# Patient Record
Sex: MALE | Race: ASIAN | NOT HISPANIC OR LATINO | Employment: OTHER | URBAN - METROPOLITAN AREA
[De-identification: names, ages, dates, MRNs, and addresses within clinical notes are randomized per-mention and may not be internally consistent; named-entity substitution may affect disease eponyms.]

---

## 2022-03-06 ENCOUNTER — APPOINTMENT (OUTPATIENT)
Dept: CT IMAGING | Facility: HOSPITAL | Age: 52
End: 2022-03-06
Attending: EMERGENCY MEDICINE
Payer: COMMERCIAL

## 2022-03-06 ENCOUNTER — APPOINTMENT (OUTPATIENT)
Dept: MRI IMAGING | Facility: HOSPITAL | Age: 52
End: 2022-03-06
Attending: EMERGENCY MEDICINE
Payer: COMMERCIAL

## 2022-03-06 ENCOUNTER — HOSPITAL ENCOUNTER (EMERGENCY)
Facility: HOSPITAL | Age: 52
Discharge: HOME | End: 2022-03-07
Attending: EMERGENCY MEDICINE | Admitting: EMERGENCY MEDICINE
Payer: COMMERCIAL

## 2022-03-06 VITALS
HEART RATE: 77 BPM | OXYGEN SATURATION: 96 % | RESPIRATION RATE: 20 BRPM | SYSTOLIC BLOOD PRESSURE: 131 MMHG | WEIGHT: 165 LBS | DIASTOLIC BLOOD PRESSURE: 71 MMHG | BODY MASS INDEX: 29.23 KG/M2 | HEIGHT: 63 IN | TEMPERATURE: 98.3 F

## 2022-03-06 DIAGNOSIS — R51.9 NONINTRACTABLE HEADACHE, UNSPECIFIED CHRONICITY PATTERN, UNSPECIFIED HEADACHE TYPE: ICD-10-CM

## 2022-03-06 DIAGNOSIS — I10 HYPERTENSION, UNSPECIFIED TYPE: ICD-10-CM

## 2022-03-06 DIAGNOSIS — N18.32 STAGE 3B CHRONIC KIDNEY DISEASE (H): ICD-10-CM

## 2022-03-06 LAB
ALBUMIN MFR UR ELPH: 22.8 MG/DL
ALBUMIN SERPL-MCNC: 4.2 G/DL (ref 3.5–5)
ALBUMIN UR-MCNC: 20 MG/DL
ALP SERPL-CCNC: 69 U/L (ref 45–120)
ALT SERPL W P-5'-P-CCNC: 49 U/L (ref 0–45)
ANION GAP SERPL CALCULATED.3IONS-SCNC: 11 MMOL/L (ref 5–18)
APPEARANCE UR: CLEAR
AST SERPL W P-5'-P-CCNC: 30 U/L (ref 0–40)
BASOPHILS # BLD AUTO: 0.1 10E3/UL (ref 0–0.2)
BASOPHILS NFR BLD AUTO: 1 %
BILIRUB SERPL-MCNC: 0.8 MG/DL (ref 0–1)
BILIRUB UR QL STRIP: NEGATIVE
BUN SERPL-MCNC: 33 MG/DL (ref 8–22)
CALCIUM SERPL-MCNC: 9.5 MG/DL (ref 8.5–10.5)
CHLORIDE BLD-SCNC: 107 MMOL/L (ref 98–107)
CO2 SERPL-SCNC: 21 MMOL/L (ref 22–31)
COLOR UR AUTO: ABNORMAL
CREAT SERPL-MCNC: 2.36 MG/DL (ref 0.7–1.3)
CREAT UR-MCNC: 144 MG/DL
EOSINOPHIL # BLD AUTO: 0.1 10E3/UL (ref 0–0.7)
EOSINOPHIL NFR BLD AUTO: 1 %
ERYTHROCYTE [DISTWIDTH] IN BLOOD BY AUTOMATED COUNT: 13.9 % (ref 10–15)
GFR SERPL CREATININE-BSD FRML MDRD: 32 ML/MIN/1.73M2
GLUCOSE BLD-MCNC: 96 MG/DL (ref 70–125)
GLUCOSE UR STRIP-MCNC: NEGATIVE MG/DL
HCT VFR BLD AUTO: 54.8 % (ref 40–53)
HGB BLD-MCNC: 18.3 G/DL (ref 13.3–17.7)
HGB UR QL STRIP: NEGATIVE
HOLD SPECIMEN: NORMAL
HOLD SPECIMEN: NORMAL
IMM GRANULOCYTES # BLD: 0 10E3/UL
IMM GRANULOCYTES NFR BLD: 0 %
KETONES UR STRIP-MCNC: NEGATIVE MG/DL
LEUKOCYTE ESTERASE UR QL STRIP: NEGATIVE
LYMPHOCYTES # BLD AUTO: 1.9 10E3/UL (ref 0.8–5.3)
LYMPHOCYTES NFR BLD AUTO: 17 %
MCH RBC QN AUTO: 29.2 PG (ref 26.5–33)
MCHC RBC AUTO-ENTMCNC: 33.4 G/DL (ref 31.5–36.5)
MCV RBC AUTO: 87 FL (ref 78–100)
MONOCYTES # BLD AUTO: 0.7 10E3/UL (ref 0–1.3)
MONOCYTES NFR BLD AUTO: 6 %
MUCOUS THREADS #/AREA URNS LPF: PRESENT /LPF
NEUTROPHILS # BLD AUTO: 8.4 10E3/UL (ref 1.6–8.3)
NEUTROPHILS NFR BLD AUTO: 75 %
NITRATE UR QL: NEGATIVE
NRBC # BLD AUTO: 0 10E3/UL
NRBC BLD AUTO-RTO: 0 /100
PH UR STRIP: 5 [PH] (ref 5–7)
PLATELET # BLD AUTO: 196 10E3/UL (ref 150–450)
POTASSIUM BLD-SCNC: 4.1 MMOL/L (ref 3.5–5)
PROT SERPL-MCNC: 8.8 G/DL (ref 6–8)
PROT/CREAT 24H UR: 0.16 MG/MG CR
RBC # BLD AUTO: 6.27 10E6/UL (ref 4.4–5.9)
RBC URINE: 0 /HPF
SODIUM SERPL-SCNC: 139 MMOL/L (ref 136–145)
SP GR UR STRIP: 1.02 (ref 1–1.03)
UROBILINOGEN UR STRIP-MCNC: <2 MG/DL
WBC # BLD AUTO: 11.1 10E3/UL (ref 4–11)
WBC URINE: 0 /HPF

## 2022-03-06 PROCEDURE — 99285 EMERGENCY DEPT VISIT HI MDM: CPT | Mod: 25

## 2022-03-06 PROCEDURE — 81001 URINALYSIS AUTO W/SCOPE: CPT | Performed by: EMERGENCY MEDICINE

## 2022-03-06 PROCEDURE — 36415 COLL VENOUS BLD VENIPUNCTURE: CPT | Performed by: EMERGENCY MEDICINE

## 2022-03-06 PROCEDURE — 85025 COMPLETE CBC W/AUTO DIFF WBC: CPT | Performed by: EMERGENCY MEDICINE

## 2022-03-06 PROCEDURE — 70549 MR ANGIOGRAPH NECK W/O&W/DYE: CPT

## 2022-03-06 PROCEDURE — 70545 MR ANGIOGRAPHY HEAD W/DYE: CPT

## 2022-03-06 PROCEDURE — 93005 ELECTROCARDIOGRAM TRACING: CPT | Performed by: STUDENT IN AN ORGANIZED HEALTH CARE EDUCATION/TRAINING PROGRAM

## 2022-03-06 PROCEDURE — 82040 ASSAY OF SERUM ALBUMIN: CPT | Performed by: EMERGENCY MEDICINE

## 2022-03-06 PROCEDURE — 250N000013 HC RX MED GY IP 250 OP 250 PS 637: Performed by: EMERGENCY MEDICINE

## 2022-03-06 PROCEDURE — 70553 MRI BRAIN STEM W/O & W/DYE: CPT

## 2022-03-06 PROCEDURE — 70544 MR ANGIOGRAPHY HEAD W/O DYE: CPT

## 2022-03-06 PROCEDURE — A9585 GADOBUTROL INJECTION: HCPCS | Performed by: EMERGENCY MEDICINE

## 2022-03-06 PROCEDURE — 84156 ASSAY OF PROTEIN URINE: CPT | Performed by: EMERGENCY MEDICINE

## 2022-03-06 PROCEDURE — 70450 CT HEAD/BRAIN W/O DYE: CPT

## 2022-03-06 PROCEDURE — 255N000002 HC RX 255 OP 636: Performed by: EMERGENCY MEDICINE

## 2022-03-06 PROCEDURE — 250N000011 HC RX IP 250 OP 636: Performed by: EMERGENCY MEDICINE

## 2022-03-06 PROCEDURE — 96374 THER/PROPH/DIAG INJ IV PUSH: CPT | Mod: 59

## 2022-03-06 PROCEDURE — 80053 COMPREHEN METABOLIC PANEL: CPT | Performed by: EMERGENCY MEDICINE

## 2022-03-06 RX ORDER — AMLODIPINE BESYLATE 5 MG/1
5 TABLET ORAL ONCE
Status: COMPLETED | OUTPATIENT
Start: 2022-03-06 | End: 2022-03-06

## 2022-03-06 RX ORDER — GADOBUTROL 604.72 MG/ML
7 INJECTION INTRAVENOUS ONCE
Status: COMPLETED | OUTPATIENT
Start: 2022-03-06 | End: 2022-03-06

## 2022-03-06 RX ORDER — MORPHINE SULFATE 4 MG/ML
4 INJECTION, SOLUTION INTRAMUSCULAR; INTRAVENOUS ONCE
Status: COMPLETED | OUTPATIENT
Start: 2022-03-06 | End: 2022-03-06

## 2022-03-06 RX ADMIN — AMLODIPINE BESYLATE 5 MG: 5 TABLET ORAL at 20:38

## 2022-03-06 RX ADMIN — GADOBUTROL 7 ML: 604.72 INJECTION INTRAVENOUS at 18:44

## 2022-03-06 RX ADMIN — MORPHINE SULFATE 4 MG: 4 INJECTION, SOLUTION INTRAMUSCULAR; INTRAVENOUS at 19:06

## 2022-03-06 NOTE — ED NOTES
Dr. Heart aware of creatinine and GFR, continue with contrast for MRI.  Also aware pt wants some pain medicine for his headache

## 2022-03-06 NOTE — ED PROVIDER NOTES
"EMERGENCY DEPARTMENT NOTE     Name: Leonela Hayden    Age/Sex: 51 year old male   MRN: [id number]   Evaluation Date & Time:  No admission date for patient encounter.    PCP:    No primary care provider on file.   ED Provider: Sandoval Heart D.O.       CHIEF COMPLAINT    Generalized Weakness, Headache, and Dizziness       DIAGNOSIS & DISPOSITION     1. Hypertension, unspecified type    2. Stage 3b chronic kidney disease (H)    3. Nonintractable headache, unspecified chronicity pattern, unspecified headache type      DISPOSITION: Home    At the conclusion of the encounter I discussed the results of all of the tests and the disposition. The questions were answered. The patient or family acknowledged understanding and was agreeable with the care plan.    TOTAL CRITICAL CARE TIME (EXCLUDING PROCEDURES): Not applicable    PROCEDURES:   None    EMERGENCY DEPARTMENT COURSE/MEDICAL DECISION MAKING   5:04 PM I met with the patient in triage to gather history and to perform my initial exam.  We discussed treatment options and the plan for care while in the Emergency Department.    Leonela Hayden is a 51 year old male with no relevant past history who presents to the emergency department for evaluation of headache and dizziness since this morning. His headache was gradual onset with generalized weakness.  Triage note reviewed:Patient presents to ED for generalized weakness,  Headache,and dizziness that began at approximately 10 AM this morning.  Provider was in triage to evaluate patient.       Differential  diagnosis  considered included but not limited to ICH, ischemic stroke, dural vein thrombosis, intracranial mass.  For generalized weakness considered electrolyte derangement, acute renal failure, ACS vital signs:BP (!) 147/75   Pulse 84   Temp 98.3  F (36.8  C) (Oral)   Resp 20   Ht 1.6 m (5' 3\")   SpO2 96%   BMI 29.23 kg/m     Pertinent physical exam findings:  General: Alert normal mental status  Cardiac: Regular rate and " rhythm S1-S2 without murmur rub  Neuro: Cranial nerves 2 through 12 are intact.  5 out of 5 motor strength upper and lower extremities.  Intact sensation to light touch and positional sense.  No pronator drift.  Normal cerebellar function with serial fingers and heel-to-shin.  Diagnostic studies:  Imaging:  MRV Brain with Contrast   Final Result   IMPRESSION:   1.  No dural venous sinus thrombosis or significant stenosis.      MRA Neck (Carotids) wo & w Contrast   Final Result   IMPRESSION:   HEAD MRI:    1.  No acute intracranial process.   2.  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   3.  Generalized brain atrophy and presumed microvascular ischemic changes as detailed above.      HEAD MRA:    1.  Intracranial atherosclerosis with mild stenosis of the distal left M1 middle cerebral artery branch and proximal left A1 anterior cerebral artery.   2.  Mild to moderate stenosis of the mid basilar artery.   3.  No proximal large vessel occlusion, aneurysm, or high flow vascular malformation.   4.  Variant [place] anatomy as above.      NECK MRA:   1.  No hemodynamically significant stenosis in the neck vessels by NASCET criteria.   2.  No evidence for dissection.      MRA Brain ([place]) wo Contrast   Final Result   IMPRESSION:   HEAD MRI:    1.  No acute intracranial process.   2.  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   3.  Generalized brain atrophy and presumed microvascular ischemic changes as detailed above.      HEAD MRA:    1.  Intracranial atherosclerosis with mild stenosis of the distal left M1 middle cerebral artery branch and proximal left A1 anterior cerebral artery.   2.  Mild to moderate stenosis of the mid basilar artery.   3.  No proximal large vessel occlusion, aneurysm, or high  flow vascular malformation.   4.  Variant [place] of [place] anatomy as above.      NECK MRA:   1.  No hemodynamically significant stenosis in the neck vessels by NASCET criteria.   2.  No evidence for dissection.      MR Brain w/o & w Contrast   Final Result   IMPRESSION:   HEAD MRI:    1.  No acute intracranial process.   2.  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   3.  Generalized brain atrophy and presumed microvascular ischemic changes as detailed above.      HEAD MRA:    1.  Intracranial atherosclerosis with mild stenosis of the distal left M1 middle cerebral artery branch and proximal left A1 anterior cerebral artery.   2.  Mild to moderate stenosis of the mid basilar artery.   3.  No proximal large vessel occlusion, aneurysm, or high flow vascular malformation.   4.  Variant [place] anatomy as above.      NECK MRA:   1.  No hemodynamically significant stenosis in the neck vessels by NASCET criteria.   2.  No evidence for dissection.      Head CT w/o contrast   Final Result   IMPRESSION:   1.  No acute intracranial abnormality.      2.  Mild age-related changes, as above. Small chronic lacunar infarct in the left lentiform nucleus.      US Renal Complete    (Results Pending)      Lab:  Labs Ordered and Resulted from Time of ED Arrival to Time of ED Departure   COMPREHENSIVE METABOLIC PANEL - Abnormal       Result Value    Sodium 139      Potassium 4.1      Chloride 107      Carbon Dioxide (CO2) 21 (*)     Anion Gap 11      Urea Nitrogen 33 (*)     Creatinine 2.36 (*)     Calcium 9.5      Glucose 96      Alkaline Phosphatase 69      AST 30      ALT 49 (*)     Protein Total 8.8 (*)     Albumin 4.2      Bilirubin Total 0.8      GFR Estimate 32 (*)    CBC WITH PLATELETS AND DIFFERENTIAL - Abnormal    WBC Count 11.1 (*)     RBC Count 6.27 (*)     Hemoglobin 18.3 (*)     Hematocrit 54.8 (*)     MCV 87       MCH 29.2      MCHC 33.4      RDW 13.9      Platelet Count 196      % Neutrophils 75      % Lymphocytes 17      % Monocytes 6      % Eosinophils 1      % Basophils 1      % Immature Granulocytes 0      NRBCs per 100 WBC 0      Absolute Neutrophils 8.4 (*)     Absolute Lymphocytes 1.9      Absolute Monocytes 0.7      Absolute Eosinophils 0.1      Absolute Basophils 0.1      Absolute Immature Granulocytes 0.0      Absolute NRBCs 0.0     ROUTINE UA WITH MICROSCOPIC REFLEX TO CULTURE - Abnormal    Color Urine Light Yellow      Appearance Urine Clear      Glucose Urine Negative      Bilirubin Urine Negative      Ketones Urine Negative      Specific Gravity Urine 1.020      Blood Urine Negative      pH Urine 5.0      Protein Albumin Urine 20  (*)     Urobilinogen Urine <2.0      Nitrite Urine Negative      Leukocyte Esterase Urine Negative      Mucus Urine Present (*)     RBC Urine 0      WBC Urine 0     PROTEIN RANDOM URINE    EKG interpretation: sinus rhythm, nonischemic  Interventions: IV morphine, oral amlodipine  Medical decision making: No evidence of hemorrhagic or ischemic stroke on CT and MRI.  No evidence of vertebral artery dissection or acute vascular problem on MRA of the head neck vessels.  No evidence of dural vein thrombosis on MRV.  EKG was nonischemic and no other symptoms of ACS reported and further evaluation with troponin not pursued.  Headache is resolved, blood pressure is improved after pain medications and amlodipine.  Patient noted on laboratory evaluation have worsening of renal function over baseline.  Reviewed the patient's blood pressure medications.  He is uncertain what he is currently taking sounds like he is on multiple meds but is only intermittently compliant with them.  I discussed the case with nephrology and will obtain renal ultrasound and urinalysis for follow-up at referral visit.  Patient will be discharged.  He will follow up with his primary care physician to review  antihypertensives and make adjustments.  Is given referral to nephrology to follow-up this week for worsening of stage III kidney disease.  Return criteria discussed and if he has headache recurrent not improved with Tylenol, has any focal neurologic symptoms, develops chest pain or shortness of breath will return the emergency department.    ED INTERVENTIONS     Medications   gadobutrol (GADAVIST) injection 7 mL (7 mLs Intravenous Given 3/6/22 1844)   morphine (PF) injection 4 mg (4 mg Intravenous Given 3/6/22 1906)   amLODIPine (NORVASC) tablet 5 mg (5 mg Oral Given 3/6/22 2038)       DISCHARGE MEDICATIONS        Review of your medicines      UNREVIEWED medicines. Ask your doctor about these medicines      Dose / Directions   cyclobenzaprine 10 MG tablet  Commonly known as: FLEXERIL  Used for: Acute left-sided thoracic back pain      Dose: 10 mg  [CYCLOBENZAPRINE (FLEXERIL) 10 MG TABLET] Take 1 tablet (10 mg total) by mouth 3 (three) times a day as needed for muscle spasms (back pain).  Quantity: 12 tablet  Refills: 0     HYDROcodone-acetaminophen 5-325 MG tablet  Commonly known as: NORCO  Used for: Acute left-sided thoracic back pain      Dose: 1 tablet  [HYDROCODONE-ACETAMINOPHEN 5-325 MG PER TABLET] Take 1 tablet by mouth every 6 (six) hours as needed for pain.  Quantity: 8 tablet  Refills: 0     lidocaine 5 % patch  Commonly known as: LIDODERM  Used for: Acute left-sided thoracic back pain      [LIDOCAINE (LIDODERM) 5 %] Remove & Discard patch within 12 hours or as directed by MD  Quantity: 15 patch  Refills: 0              INFORMATION SOURCE AND LIMITATIONS    History/Exam limitations: N/A  Patient information was obtained from: Patient  Use of : N/A    HISTORY OF PRESENT ILLNESS   Leonela Jackelyn male with no relevant past history, who presents to this ED via walk-in for evaluation of headache and dizziness.    Patient reports a headache localized to the left occipital area starting this morning around  1000 with gradual onset and associated dizziness described as room spinning. He endorses occasionally having headaches associated with a rise in his blood pressure. He endorses generalized weakness. Patient denies vision trouble, nausea, vomiting, neck pain or stiffness, chest pain, shortness of breath, or any other current complaints.      REVIEW OF SYSTEMS:   Constitutional: Negative for  fever. Positive for generalized weakness.  HENT: Negative for URI symptoms or sore throat, neck pain/stiffness, or vision trouble.  Cardiac: Negative for  chest pain,palpitations, near syncope or syncope  Respiratory: Negative for cough and shortness of breath.    Gastrointestinal: Negative for abdominal pain, nausea, vomiting, constipation, diarrhea, rectal bleeding or melena.  Genitourinary: Negative for dysuria, flank pain and hematuria.   Musculoskeletal: Negative for back pain.   Skin: Negative for  rash  Neurological: Negative for syncope, speech difficulty, unilateral weakness or imbalance with walking. Positive for dizziness and headache.  Hematological: Negative for adenopathy. Does not bruise/bleed easily.   Psychiatric/Behavioral: Negative for confusion.       PATIENT HISTORY   No past medical history on file.  There is no problem list on file for this patient.    No past surgical history on file.  Social Histrory  Smoking:  Alcohol Use:  No Known Allergies      OUTPATIENT MEDICATIONS     New Prescriptions    No medications on file      Vitals:    03/06/22 1900 03/06/22 2015 03/06/22 2030 03/06/22 2145   BP: (!) 203/96 (!) 185/87 (!) 162/73 (!) 147/75   Pulse:  90 86 84   Resp: 20      Temp:       TempSrc:       SpO2:  99% 98% 96%   Height:           Physical Exam   Constitutional: Oriented to person, place, and time. Appears well-developed and well-nourished.   HEENT:    Head: Atraumatic.   Neck: Normal range of motion. Neck supple.   Cardiovascular: Normal rate, regular rhythm and normal heart sounds.     Pulmonary/Chest: Normal effort  and breath sounds normal.   Abdominal: Soft. Bowel sounds are normal.   Musculoskeletal: Normal range of motion.   Neurological: Alert and oriented to person, place, and time. Normal strength.No sensory deficit. No cranial nerve deficit . Skin: Skin is warm and dry.   Psychiatric: Normal mood and affect. Behavior is normal. Thought content normal.       DIAGNOSTICS    LABORATORY FINDINGS (REVIEWED AND INTERPRETED):  Labs Ordered and Resulted from Time of ED Arrival to Time of ED Departure   COMPREHENSIVE METABOLIC PANEL - Abnormal       Result Value    Sodium 139      Potassium 4.1      Chloride 107      Carbon Dioxide (CO2) 21 (*)     Anion Gap 11      Urea Nitrogen 33 (*)     Creatinine 2.36 (*)     Calcium 9.5      Glucose 96      Alkaline Phosphatase 69      AST 30      ALT 49 (*)     Protein Total 8.8 (*)     Albumin 4.2      Bilirubin Total 0.8      GFR Estimate 32 (*)    CBC WITH PLATELETS AND DIFFERENTIAL - Abnormal    WBC Count 11.1 (*)     RBC Count 6.27 (*)     Hemoglobin 18.3 (*)     Hematocrit 54.8 (*)     MCV 87      MCH 29.2      MCHC 33.4      RDW 13.9      Platelet Count 196      % Neutrophils 75      % Lymphocytes 17      % Monocytes 6      % Eosinophils 1      % Basophils 1      % Immature Granulocytes 0      NRBCs per 100 WBC 0      Absolute Neutrophils 8.4 (*)     Absolute Lymphocytes 1.9      Absolute Monocytes 0.7      Absolute Eosinophils 0.1      Absolute Basophils 0.1      Absolute Immature Granulocytes 0.0      Absolute NRBCs 0.0     ROUTINE UA WITH MICROSCOPIC REFLEX TO CULTURE - Abnormal    Color Urine Light Yellow      Appearance Urine Clear      Glucose Urine Negative      Bilirubin Urine Negative      Ketones Urine Negative      Specific Gravity Urine 1.020      Blood Urine Negative      pH Urine 5.0      Protein Albumin Urine 20  (*)     Urobilinogen Urine <2.0      Nitrite Urine Negative      Leukocyte Esterase Urine Negative      Mucus Urine  Present (*)     RBC Urine 0      WBC Urine 0     PROTEIN RANDOM URINE         IMAGING (REVIEWED AND INTERPRETED):  MRV Brain with Contrast   Final Result   IMPRESSION:   1.  No dural venous sinus thrombosis or significant stenosis.      MRA Neck (Carotids) wo & w Contrast   Final Result   IMPRESSION:   HEAD MRI:    1.  No acute intracranial process.   2.  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   3.  Generalized brain atrophy and presumed microvascular ischemic changes as detailed above.      HEAD MRA:    1.  Intracranial atherosclerosis with mild stenosis of the distal left M1 middle cerebral artery branch and proximal left A1 anterior cerebral artery.   2.  Mild to moderate stenosis of the mid basilar artery.   3.  No proximal large vessel occlusion, aneurysm, or high flow vascular malformation.   4.  Variant [place] anatomy as above.      NECK MRA:   1.  No hemodynamically significant stenosis in the neck vessels by NASCET criteria.   2.  No evidence for dissection.      MRA Brain ([place] of [place]) wo Contrast   Final Result   IMPRESSION:   HEAD MRI:    1.  No acute intracranial process.   2.  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   3.  Generalized brain atrophy and presumed microvascular ischemic changes as detailed above.      HEAD MRA:    1.  Intracranial atherosclerosis with mild stenosis of the distal left M1 middle cerebral artery branch and proximal left A1 anterior cerebral artery.   2.  Mild to moderate stenosis of the mid basilar artery.   3.  No proximal large vessel occlusion, aneurysm, or high flow vascular malformation.   4.  Variant [place] anatomy as above.      NECK MRA:   1.  No hemodynamically significant stenosis in the neck vessels by NASCET criteria.   2.  No evidence  for dissection.      MR Brain w/o & w Contrast   Final Result   IMPRESSION:   HEAD MRI:    1.  No acute intracranial process.   2.  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   3.  Generalized brain atrophy and presumed microvascular ischemic changes as detailed above.      HEAD MRA:    1.  Intracranial atherosclerosis with mild stenosis of the distal left M1 middle cerebral artery branch and proximal left A1 anterior cerebral artery.   2.  Mild to moderate stenosis of the mid basilar artery.   3.  No proximal large vessel occlusion, aneurysm, or high flow vascular malformation.   4.  Variant [place] anatomy as above.      NECK MRA:   1.  No hemodynamically significant stenosis in the neck vessels by NASCET criteria.   2.  No evidence for dissection.      Head CT w/o contrast   Final Result   IMPRESSION:   1.  No acute intracranial abnormality.      2.  Mild age-related changes, as above. Small chronic lacunar infarct in the left lentiform nucleus.      US Renal Complete    (Results Pending)           ECG (REVIEWED AND INTERPRETED):   ECG:   Performed at: 17:22  HR:  91 bpm  Rhythm: sinus rhythm  Axis: 39 ms  QRS duration: 94 ms  QTC: 469 ms  ST changes: No ST segment elevation or depression, no T wave inversion,No Q wave  Interpretation: Sinus rhythm  Compared to most recent ECG from: 16-APR-2021 no significant change found.     I have reviewed the patient's ECG, with comments made as listed above. Please see scanned image for full interpretation.         I, Delmy Meyers, am serving as a scribe to document services personally performed by Sandoval Heart D.O., based on my observation and the provider s statements to me.    I, Sandoval Heart D.O., attest that Delmy Meyers is acting in a scribe capacity, has observed my performance of the services and has documented them in accordance with my  direction.    Sandoval Heart D.O.  EMERGENCY MEDICINE   03/06/22  [facility] EMERGENCY DEPARTMENT  [facility] [facility] [id number] [phone]  Dept: [phone]     Sandoval Heart DO  03/06/22 2242

## 2022-03-06 NOTE — ED TRIAGE NOTES
Patient presents to ED for generalized weakness,  Headache,and dizziness that began at approximately 10 AM this morning.  Provider was in triage to evaluate patient.

## 2022-03-07 ENCOUNTER — APPOINTMENT (OUTPATIENT)
Dept: ULTRASOUND IMAGING | Facility: HOSPITAL | Age: 52
End: 2022-03-07
Attending: EMERGENCY MEDICINE
Payer: COMMERCIAL

## 2022-03-07 LAB
ATRIAL RATE - MUSE: 91 BPM
DIASTOLIC BLOOD PRESSURE - MUSE: 96 MMHG
INTERPRETATION ECG - MUSE: NORMAL
P AXIS - MUSE: 55 DEGREES
PR INTERVAL - MUSE: 164 MS
QRS DURATION - MUSE: 94 MS
QT - MUSE: 382 MS
QTC - MUSE: 469 MS
R AXIS - MUSE: -39 DEGREES
SYSTOLIC BLOOD PRESSURE - MUSE: 197 MMHG
T AXIS - MUSE: 84 DEGREES
VENTRICULAR RATE- MUSE: 91 BPM

## 2022-03-07 PROCEDURE — 76770 US EXAM ABDO BACK WALL COMP: CPT

## 2022-03-07 PROCEDURE — 93975 VASCULAR STUDY: CPT

## 2022-03-07 NOTE — DISCHARGE INSTRUCTIONS
Follow-up with Dr. Carbajal this week to review your blood pressure make medication and make any adjustments.  Follow-up with kidney specialist for evaluation of your stage III kidney disease.  Take Tylenol 650 mg every 6 hours as needed for any recurrent headache.  If recurrent headache not improved with Tylenol, any neurological symptoms trouble with your speech use of arms or legs, imbalance with walking return to the emergency department.

## 2023-11-08 ENCOUNTER — APPOINTMENT (OUTPATIENT)
Dept: INTERPRETER SERVICES | Facility: CLINIC | Age: 53
End: 2023-11-08
Payer: COMMERCIAL

## 2025-06-25 ENCOUNTER — HOSPITAL ENCOUNTER (EMERGENCY)
Facility: HOSPITAL | Age: 55
Discharge: HOME | End: 2025-06-25
Attending: FAMILY MEDICINE
Payer: COMMERCIAL

## 2025-06-25 ENCOUNTER — APPOINTMENT (OUTPATIENT)
Dept: ULTRASOUND IMAGING | Facility: HOSPITAL | Age: 55
End: 2025-06-25
Payer: COMMERCIAL

## 2025-06-25 ENCOUNTER — APPOINTMENT (OUTPATIENT)
Dept: RADIOLOGY | Facility: HOSPITAL | Age: 55
End: 2025-06-25
Payer: COMMERCIAL

## 2025-06-25 VITALS
OXYGEN SATURATION: 98 % | DIASTOLIC BLOOD PRESSURE: 59 MMHG | BODY MASS INDEX: 26.91 KG/M2 | HEIGHT: 64 IN | TEMPERATURE: 98.1 F | HEART RATE: 53 BPM | RESPIRATION RATE: 16 BRPM | WEIGHT: 157.6 LBS | SYSTOLIC BLOOD PRESSURE: 125 MMHG

## 2025-06-25 DIAGNOSIS — R60.0 PEDAL EDEMA: ICD-10-CM

## 2025-06-25 DIAGNOSIS — M79.671 RIGHT FOOT PAIN: ICD-10-CM

## 2025-06-25 LAB
ALBUMIN SERPL BCG-MCNC: 3.9 G/DL (ref 3.5–5.2)
ALBUMIN UR-MCNC: 30 MG/DL
ALP SERPL-CCNC: 80 U/L (ref 40–150)
ALT SERPL W P-5'-P-CCNC: 34 U/L (ref 0–70)
ANION GAP SERPL CALCULATED.3IONS-SCNC: 9 MMOL/L (ref 7–15)
APPEARANCE UR: CLEAR
AST SERPL W P-5'-P-CCNC: 31 U/L (ref 0–45)
ATRIAL RATE - MUSE: 53 BPM
BASOPHILS # BLD AUTO: 0.1 10E3/UL (ref 0–0.2)
BASOPHILS NFR BLD AUTO: 1 %
BILIRUB DIRECT SERPL-MCNC: 0.18 MG/DL (ref 0–0.3)
BILIRUB SERPL-MCNC: 0.5 MG/DL
BILIRUB UR QL STRIP: NEGATIVE
BUN SERPL-MCNC: 30.8 MG/DL (ref 6–20)
CALCIUM SERPL-MCNC: 9.2 MG/DL (ref 8.8–10.4)
CHLORIDE SERPL-SCNC: 109 MMOL/L (ref 98–107)
COLOR UR AUTO: ABNORMAL
CREAT SERPL-MCNC: 2.07 MG/DL (ref 0.67–1.17)
DIASTOLIC BLOOD PRESSURE - MUSE: 58 MMHG
EGFRCR SERPLBLD CKD-EPI 2021: 37 ML/MIN/1.73M2
EOSINOPHIL # BLD AUTO: 0.9 10E3/UL (ref 0–0.7)
EOSINOPHIL NFR BLD AUTO: 11 %
ERYTHROCYTE [DISTWIDTH] IN BLOOD BY AUTOMATED COUNT: 13.3 % (ref 10–15)
GLUCOSE SERPL-MCNC: 95 MG/DL (ref 70–99)
GLUCOSE UR STRIP-MCNC: NEGATIVE MG/DL
HCO3 SERPL-SCNC: 22 MMOL/L (ref 22–29)
HCT VFR BLD AUTO: 45.4 % (ref 40–53)
HGB BLD-MCNC: 14.4 G/DL (ref 13.3–17.7)
HGB UR QL STRIP: NEGATIVE
HYALINE CASTS: 1 /LPF
IMM GRANULOCYTES # BLD: 0 10E3/UL
IMM GRANULOCYTES NFR BLD: 0 %
INTERPRETATION ECG - MUSE: NORMAL
KETONES UR STRIP-MCNC: NEGATIVE MG/DL
LEUKOCYTE ESTERASE UR QL STRIP: NEGATIVE
LYMPHOCYTES # BLD AUTO: 1.5 10E3/UL (ref 0.8–5.3)
LYMPHOCYTES NFR BLD AUTO: 18 %
MCH RBC QN AUTO: 28.5 PG (ref 26.5–33)
MCHC RBC AUTO-ENTMCNC: 31.7 G/DL (ref 31.5–36.5)
MCV RBC AUTO: 90 FL (ref 78–100)
MONOCYTES # BLD AUTO: 0.7 10E3/UL (ref 0–1.3)
MONOCYTES NFR BLD AUTO: 9 %
NEUTROPHILS # BLD AUTO: 5.3 10E3/UL (ref 1.6–8.3)
NEUTROPHILS NFR BLD AUTO: 62 %
NITRATE UR QL: NEGATIVE
NRBC # BLD AUTO: 0 10E3/UL
NRBC BLD AUTO-RTO: 0 /100
NT-PROBNP SERPL-MCNC: 715 PG/ML (ref 0–177)
P AXIS - MUSE: 52 DEGREES
PH UR STRIP: 5.5 [PH] (ref 5–7)
PLATELET # BLD AUTO: 174 10E3/UL (ref 150–450)
POTASSIUM SERPL-SCNC: 4.7 MMOL/L (ref 3.4–5.3)
PR INTERVAL - MUSE: 192 MS
PROT SERPL-MCNC: 7.5 G/DL (ref 6.4–8.3)
QRS DURATION - MUSE: 130 MS
QT - MUSE: 490 MS
QTC - MUSE: 459 MS
R AXIS - MUSE: -65 DEGREES
RBC # BLD AUTO: 5.05 10E6/UL (ref 4.4–5.9)
RBC URINE: <1 /HPF
SODIUM SERPL-SCNC: 140 MMOL/L (ref 135–145)
SP GR UR STRIP: 1.01 (ref 1–1.03)
SYSTOLIC BLOOD PRESSURE - MUSE: 117 MMHG
T AXIS - MUSE: 16 DEGREES
UROBILINOGEN UR STRIP-MCNC: NORMAL MG/DL
VENTRICULAR RATE- MUSE: 53 BPM
WBC # BLD AUTO: 8.5 10E3/UL (ref 4–11)
WBC URINE: 0 /HPF

## 2025-06-25 PROCEDURE — 85004 AUTOMATED DIFF WBC COUNT: CPT

## 2025-06-25 PROCEDURE — 81001 URINALYSIS AUTO W/SCOPE: CPT

## 2025-06-25 PROCEDURE — 93005 ELECTROCARDIOGRAM TRACING: CPT

## 2025-06-25 PROCEDURE — 93971 EXTREMITY STUDY: CPT | Mod: RT

## 2025-06-25 PROCEDURE — 99285 EMERGENCY DEPT VISIT HI MDM: CPT | Mod: 25 | Performed by: FAMILY MEDICINE

## 2025-06-25 PROCEDURE — 250N000013 HC RX MED GY IP 250 OP 250 PS 637

## 2025-06-25 PROCEDURE — 83880 ASSAY OF NATRIURETIC PEPTIDE: CPT

## 2025-06-25 PROCEDURE — 96374 THER/PROPH/DIAG INJ IV PUSH: CPT | Performed by: FAMILY MEDICINE

## 2025-06-25 PROCEDURE — 73630 X-RAY EXAM OF FOOT: CPT | Mod: RT

## 2025-06-25 PROCEDURE — 36415 COLL VENOUS BLD VENIPUNCTURE: CPT

## 2025-06-25 PROCEDURE — 82248 BILIRUBIN DIRECT: CPT

## 2025-06-25 PROCEDURE — 82310 ASSAY OF CALCIUM: CPT

## 2025-06-25 PROCEDURE — 250N000011 HC RX IP 250 OP 636

## 2025-06-25 RX ORDER — FUROSEMIDE 20 MG/1
40 TABLET ORAL DAILY
Qty: 60 TABLET | Refills: 0 | Status: SHIPPED | OUTPATIENT
Start: 2025-06-25

## 2025-06-25 RX ORDER — MORPHINE SULFATE 4 MG/ML
4 INJECTION, SOLUTION INTRAMUSCULAR; INTRAVENOUS
Refills: 0 | Status: COMPLETED | OUTPATIENT
Start: 2025-06-25 | End: 2025-06-25

## 2025-06-25 RX ORDER — ACETAMINOPHEN 325 MG/1
975 TABLET ORAL ONCE
Status: COMPLETED | OUTPATIENT
Start: 2025-06-25 | End: 2025-06-25

## 2025-06-25 RX ADMIN — MORPHINE SULFATE 4 MG: 4 INJECTION, SOLUTION INTRAMUSCULAR; INTRAVENOUS at 13:33

## 2025-06-25 RX ADMIN — ACETAMINOPHEN 975 MG: 325 TABLET ORAL at 13:38

## 2025-06-25 ASSESSMENT — COLUMBIA-SUICIDE SEVERITY RATING SCALE - C-SSRS
1. IN THE PAST MONTH, HAVE YOU WISHED YOU WERE DEAD OR WISHED YOU COULD GO TO SLEEP AND NOT WAKE UP?: NO
2. HAVE YOU ACTUALLY HAD ANY THOUGHTS OF KILLING YOURSELF IN THE PAST MONTH?: NO
6. HAVE YOU EVER DONE ANYTHING, STARTED TO DO ANYTHING, OR PREPARED TO DO ANYTHING TO END YOUR LIFE?: NO

## 2025-06-25 ASSESSMENT — ACTIVITIES OF DAILY LIVING (ADL)
ADLS_ACUITY_SCORE: 41

## 2025-06-25 NOTE — ED TRIAGE NOTES
Patient reports bilateral foot swelling x 2 weeks. Over the last 2 days he developed right foot pain that he rated 10 out of 10. Last took tylenol at 0630.      Triage Assessment (Adult)       Row Name 06/25/25 1233          Triage Assessment    Airway WDL WDL        Respiratory WDL    Respiratory WDL WDL        Peripheral/Neurovascular WDL    Peripheral Neurovascular WDL X        Cognitive/Neuro/Behavioral WDL    Cognitive/Neuro/Behavioral WDL WDL

## 2025-06-25 NOTE — ED PROVIDER NOTES
Emergency Department Midlevel Supervisory Note     I had a face to face encounter with this patient seen by the Advanced Practice Provider (JAN). I personally made/approved the management plan and take responsibility for the patient management. I personally saw patient and performed a substantive portion of the visit including all aspects of the medical decision making.     ED Course:  1:38 PM  Patti Reid PA-C staffed patient with me. I agree with their assessment and plan of management, and I will see the patient.  2:39 PM I met with the patient to introduce myself, gather additional history, perform my initial exam, and discuss the plan.     EKG: I reviewed and independently interpreted the patient's EKG, with comments made as listed below. Please see scanned EKG for full report.   Independently reviewed and interpreted by me  Performed at: 4:07 PM  Impression: Left axis deviation with left ventricular hypertrophy  Rate: 53  Rhythm: Sinus  Axis: Normal  MS Interval: 192  QRS Interval: 130  QTc Interval: 459  ST Changes: No acute ischemic change  Comparison: March 2022, rate is decreased    Procedures:  I was present for the key portions of procedures documented in JAN/midlevel note, see midlevel note for further details.    MDM:  Patient presents today with bilateral lower extremity edema for the last couple of weeks, worse on the right and increased pain on the right.  Swelling worsens during the day and improves at night.  No chest pain, shortness of breath.  On exam here, patient is vitally stable, he has bilateral lower extremity swelling, slightly worse on the right.  Labs with , creatinine 2.07 which is stable for the patient, CBC is normal.  Ultrasound of the right lower extremity demonstrates a possible right-sided heart pressures, x-ray of the foot is negative for fracture or foreign body.  Patient was referred to cardiology given lower extremity swelling with concern for elevated filling  "pressures on the right on ultrasound, will be started on Lasix and stable for discharge.       1. Pedal edema    2. Right foot pain          Brief HPI:     Leonela Hayden is a 55 year old male who presents for evaluation of two weeks of bilateral leg edema, worse on the right. He has noticed the swelling worsens throughout the day as he is up and walking but improves when he elevates his feet in the evening. Endorses 10/10 leg pain when up and walking.    I, Ki Deng, am serving as a scribe to document services personally performed by Cezar Shaffer M.D., based on my observations and the provider's statements to me.   ICezar M.D. attest that Ki Deng was acting in a scribe capacity, has observed my performance of the services and has documented them in accordance with my direction.    Brief Physical Exam: /58   Pulse 54   Temp 98.1  F (36.7  C)   Resp 16   Ht 1.626 m (5' 4\")   Wt 71.5 kg (157 lb 9.6 oz)   SpO2 99%   BMI 27.05 kg/m    Physical Exam  Vitals and nursing note reviewed.   Constitutional:       Appearance: Normal appearance.   HENT:      Head: Normocephalic and atraumatic.      Right Ear: External ear normal.      Left Ear: External ear normal.      Nose: Nose normal.   Eyes:      Extraocular Movements: Extraocular movements intact.      Conjunctiva/sclera: Conjunctivae normal.      Pupils: Pupils are equal, round, and reactive to light.   Pulmonary:      Effort: Pulmonary effort is normal.   Musculoskeletal:         General: No swelling or deformity. Normal range of motion.      Cervical back: Normal range of motion.      Right lower leg: Edema present.      Left lower leg: Edema present.   Neurological:      General: No focal deficit present.      Mental Status: He is alert and oriented to person, place, and time. Mental status is at baseline.   Psychiatric:         Mood and Affect: Mood normal.         Behavior: Behavior normal.         Thought Content: " Thought content normal.           Labs and Imaging:  Results for orders placed or performed during the hospital encounter of 06/25/25   US Lower Extremity Venous Duplex Right    Impression    IMPRESSION:  1.  No deep venous thrombosis in the right lower extremity.  2.  Increased phasicity and muted augmentation of flow throughout the right lower extremity, suggesting possible elevated right-sided heart pressures.     Foot  XR, G/E 3 views, right    Impression    IMPRESSION: The right foot is negative for fracture. Mild degenerative change at the first MTP joint. There is slight soft tissue swelling around the forefoot. There is soft tissue swelling around the ankle.   Basic metabolic panel   Result Value Ref Range    Sodium 140 135 - 145 mmol/L    Potassium 4.7 3.4 - 5.3 mmol/L    Chloride 109 (H) 98 - 107 mmol/L    Carbon Dioxide (CO2) 22 22 - 29 mmol/L    Anion Gap 9 7 - 15 mmol/L    Urea Nitrogen 30.8 (H) 6.0 - 20.0 mg/dL    Creatinine 2.07 (H) 0.67 - 1.17 mg/dL    GFR Estimate 37 (L) >60 mL/min/1.73m2    Calcium 9.2 8.8 - 10.4 mg/dL    Glucose 95 70 - 99 mg/dL   NT-proBNP   Result Value Ref Range    NT-proBNP 715 (H) 0 - 177 pg/mL   Hepatic function panel   Result Value Ref Range    Protein Total 7.5 6.4 - 8.3 g/dL    Albumin 3.9 3.5 - 5.2 g/dL    Bilirubin Total 0.5 <=1.2 mg/dL    Alkaline Phosphatase 80 40 - 150 U/L    AST 31 0 - 45 U/L    ALT 34 0 - 70 U/L    Bilirubin Direct 0.18 0.00 - 0.30 mg/dL   CBC with platelets and differential   Result Value Ref Range    WBC Count 8.5 4.0 - 11.0 10e3/uL    RBC Count 5.05 4.40 - 5.90 10e6/uL    Hemoglobin 14.4 13.3 - 17.7 g/dL    Hematocrit 45.4 40.0 - 53.0 %    MCV 90 78 - 100 fL    MCH 28.5 26.5 - 33.0 pg    MCHC 31.7 31.5 - 36.5 g/dL    RDW 13.3 10.0 - 15.0 %    Platelet Count 174 150 - 450 10e3/uL    % Neutrophils 62 %    % Lymphocytes 18 %    % Monocytes 9 %    % Eosinophils 11 %    % Basophils 1 %    % Immature Granulocytes 0 %    NRBCs per 100 WBC 0 <1 /100     Absolute Neutrophils 5.3 1.6 - 8.3 10e3/uL    Absolute Lymphocytes 1.5 0.8 - 5.3 10e3/uL    Absolute Monocytes 0.7 0.0 - 1.3 10e3/uL    Absolute Eosinophils 0.9 (H) 0.0 - 0.7 10e3/uL    Absolute Basophils 0.1 0.0 - 0.2 10e3/uL    Absolute Immature Granulocytes 0.0 <=0.4 10e3/uL    Absolute NRBCs 0.0 10e3/uL         Cezar Shaffer M.D.  [facility] EMERGENCY DEPARTMENT  [address] [place] [id number]  [phone]     Cezar Shaffer MD  06/25/25 5020

## 2025-06-25 NOTE — DISCHARGE INSTRUCTIONS
Your lab work shows chronic kidney disease and some signs of fluid overload, you were started on a new medication to help with this to take once a day. A cardiology (heart doctor) referral was also placed, they will call you to schedule an appointment.     Continue to use tylenol for pain, you can purchase compression socks to help with the pain and swelling as well. Follow up with your primary care provider for further recommendations.

## 2025-06-25 NOTE — ED PROVIDER NOTES
Emergency Department Encounter   NAME: Leonela Hayden ; AGE: 55 year old male ; YOB: 1970 ; MRN: [id number] ; PCP: No Ref-Primary, Physician   ED PROVIDER: Patti Reid PA-C    Evaluation Date & Time:   6/25/2025 12:36 PM    CHIEF COMPLAINT:  Leg Swelling      Impression and Plan   MDM: Leonela Hayden is a 55 year old male who presents to the ED for evaluation of leg swelling.  Patient states for the last 2 weeks he has had bilateral lower extremity swelling, worse when he is up and walking throughout the day, better at night when he puts his feet up.  However over the last 2 days he reports his right foot has been consistently swelling and increasingly painful, does not feel the swelling is going down in the evening.  States it is a 10/10 pain when he is up walking on it and he is unable to go to work.  Denies any trauma or injury to the area, denies stepping on anything. No new medications. Was seen by PCP yesterday with no interventions.    Patient is mildly hypertensive upon arrival, but normalizes on repeat.  Otherwise vitally normal, no acute distress. Physical exam is significant for swelling with 1+ pitting edema in the right foot and into the right ankle.  Heart lung sounds are clear on auscultation.  No other signs of fluid overload.  Differential diagnosis includes DVT, cellulitis, venous insufficiency, medications, renal insufficiency, new onset heart failure or exacerbation.    I independently reviewed and interpreted all labs and imaging;  Labs show overall normal CBC, BMP shows decreased kidney function with a creatinine of 2.07, GFR of 37, which is consistent with previous from 3 years ago and results from yesterday.  Liver function is normal.  BNP is mildly elevated at 715.  UA shows no signs of infection.  Right lower extremity ultrasound shows no sign of a DVT, right foot x-ray shows swelling but no retained foreign body or other acute findings or bony injury. EKG shows sinus bradycardia but no  ST elevation consistent with STEMI    On reevaluation, patient reports moderate improvement in pain after medications. We discussed that his lab results overall look good, show signs of mild fluid overload. Will start on Lasix and give a cardiology referral, which he agrees with.    Return precautions to the ED were discussed, patient verbalized understanding and were agreeable with the plan. All questions were answered.     Per chart review:  -Seen yesterday by [facility], had BMP completed but not started on any interventions  - Recent labs and imaging reviewed  - Care everywhere reviewed    Medical Decision Making      Discharge. No recommendations on prescription strength medication(s). N/A.    MIPS (CTPE, Dental pain, Gandara, Sinusitis, Asthma/COPD, Head Trauma): Not Applicable    SEPSIS: None        ED COURSE:  12:47 PM I met and introduced myself to the patient. I gathered initial history and performed my physical exam. We discussed plan for initial workup.   1:42 PM I have staffed the patient with Dr. Shaffer, ED MD, who has evaluated the patient and agrees with all aspects of today's care.   4:36 PM I rechecked the patient and discussed results, discharge, follow up, and reasons to return to the ED.       FINAL IMPRESSION:    ICD-10-CM    1. Pedal edema  R60.0 [facility]  Referral     CANCELED: [facility]  Referral      2. Right foot pain  M79.671             MEDICATIONS GIVEN IN THE EMERGENCY DEPARTMENT:  Medications   acetaminophen (TYLENOL) tablet 975 mg (975 mg Oral $Given 6/25/25 1338)   morphine (PF) injection 4 mg (4 mg Intravenous $Given 6/25/25 1333)         NEW PRESCRIPTIONS STARTED AT TODAY'S ED VISIT:  New Prescriptions    FUROSEMIDE (LASIX) 20 MG TABLET    Take 2 tablets (40 mg) by mouth daily.         HPI   Use of Intrepreter: Yes- son, Hmong     Leonela Hayden is a 55 year old male with a pertinent history of hypertension, stage 3 kidney  "disease, hypercholesterolemia who presents to the ED by walk in for evaluation of  leg swelling.  Patient states for the last 2 weeks he has had bilateral lower extremity swelling, worse when he is up and walking throughout the day, better at night when he puts his feet up.  However over the last 2 days he reports his right foot has been consistently swelling and increasingly painful, does not feel the swelling is going down in the evening.  States it is a 10/10 pain when he is up walking on it and he is unable to go to work.  Denies any trauma or injury to the area, denies stepping on anything. No new medications. Was seen by PCP yesterday with no interventions..       REVIEW OF SYSTEMS:  Pertinent positive and negative symptoms per HPI.       Medical History     No past medical history on file.    No past surgical history on file.    No family history on file.         furosemide (LASIX) 20 MG tablet  cyclobenzaprine (FLEXERIL) 10 MG tablet  HYDROcodone-acetaminophen 5-325 mg per tablet  lidocaine (LIDODERM) 5 %          Physical Exam     First Vitals:  Patient Vitals for the past 24 hrs:   BP Temp Pulse Resp SpO2 Height Weight   06/25/25 1350 117/58 -- 54 -- 99 % -- --   06/25/25 1330 124/59 -- 53 -- 100 % -- --   06/25/25 1327 138/62 -- 53 -- 100 % -- --   06/25/25 1234 -- -- -- -- 100 % -- --   06/25/25 1231 (!) 146/66 98.1  F (36.7  C) 59 16 -- 1.626 m (5' 4\") 71.5 kg (157 lb 9.6 oz)         PHYSICAL EXAM:   Physical Exam  Constitutional:       General: He is not in acute distress.     Appearance: Normal appearance. He is not toxic-appearing.   HENT:      Head: Atraumatic.      Nose: Nose normal.      Mouth/Throat:      Mouth: Mucous membranes are moist.   Eyes:      General: No scleral icterus.     Extraocular Movements: Extraocular movements intact.      Conjunctiva/sclera: Conjunctivae normal.      Pupils: Pupils are equal, round, and reactive to light.   Cardiovascular:      Rate and Rhythm: Normal rate and " regular rhythm.      Heart sounds: Normal heart sounds.   Pulmonary:      Effort: Pulmonary effort is normal. No respiratory distress.      Breath sounds: Normal breath sounds.   Abdominal:      General: There is no distension.      Palpations: Abdomen is soft.      Tenderness: There is no abdominal tenderness. There is no right CVA tenderness or guarding.   Musculoskeletal:         General: No deformity.      Cervical back: Normal range of motion and neck supple. No rigidity or tenderness.      Right knee: No swelling or bony tenderness. No tenderness. Normal pulse.      Right lower leg: No swelling, tenderness or bony tenderness. No edema.      Right ankle: No swelling. No tenderness. Normal range of motion. Anterior drawer test negative.      Right Achilles Tendon: No tenderness.      Right foot: Swelling (1+ pitting edema) and tenderness present. No bony tenderness. Normal pulse.      Left foot: Normal.   Skin:     General: Skin is warm.      Capillary Refill: Capillary refill takes less than 2 seconds.   Neurological:      Mental Status: He is alert.             Results     LAB:  All pertinent labs reviewed and interpreted  Labs Ordered and Resulted from Time of ED Arrival to Time of ED Departure   BASIC METABOLIC PANEL - Abnormal       Result Value    Sodium 140      Potassium 4.7      Chloride 109 (*)     Carbon Dioxide (CO2) 22      Anion Gap 9      Urea Nitrogen 30.8 (*)     Creatinine 2.07 (*)     GFR Estimate 37 (*)     Calcium 9.2      Glucose 95     NT-PROBNP - Abnormal    NT-proBNP 715 (*)    CBC WITH PLATELETS AND DIFFERENTIAL - Abnormal    WBC Count 8.5      RBC Count 5.05      Hemoglobin 14.4      Hematocrit 45.4      MCV 90      MCH 28.5      MCHC 31.7      RDW 13.3      Platelet Count 174      % Neutrophils 62      % Lymphocytes 18      % Monocytes 9      % Eosinophils 11      % Basophils 1      % Immature Granulocytes 0      NRBCs per 100 WBC 0      Absolute Neutrophils 5.3      Absolute  Lymphocytes 1.5      Absolute Monocytes 0.7      Absolute Eosinophils 0.9 (*)     Absolute Basophils 0.1      Absolute Immature Granulocytes 0.0      Absolute NRBCs 0.0     HEPATIC FUNCTION PANEL - Normal    Protein Total 7.5      Albumin 3.9      Bilirubin Total 0.5      Alkaline Phosphatase 80      AST 31      ALT 34      Bilirubin Direct 0.18     ROUTINE UA WITH MICROSCOPIC REFLEX TO CULTURE       RADIOLOGY:  Foot  XR, G/E 3 views, right   Final Result   IMPRESSION: The right foot is negative for fracture. Mild degenerative change at the first MTP joint. There is slight soft tissue swelling around the forefoot. There is soft tissue swelling around the ankle.      US Lower Extremity Venous Duplex Right   Final Result   IMPRESSION:   1.  No deep venous thrombosis in the right lower extremity.   2.  Increased phasicity and muted augmentation of flow throughout the right lower extremity, suggesting possible elevated right-sided heart pressures.               ECG:  Performed at: 1607    Impression: Sinus bradycardia    Rate: 53  Rhythm: Sinus bradycardia  Axis: -65  OR Interval: 192  QRS Interval: 130  QTc Interval: 459  ST Changes: None  Comparison: 3/6/2022    EKG results reviewed and interpreted by Dr. Kisha ED MD.     PROCEDURES:  None    Patti Reid PA-C   Emergency Medicine   [facility] EMERGENCY DEPARTMENT       Patti Reid PA-C  06/25/25 8834

## 2025-07-09 ENCOUNTER — OFFICE VISIT (OUTPATIENT)
Dept: CARDIOLOGY | Facility: CLINIC | Age: 55
End: 2025-07-09
Payer: COMMERCIAL

## 2025-07-09 VITALS
BODY MASS INDEX: 27.31 KG/M2 | DIASTOLIC BLOOD PRESSURE: 50 MMHG | WEIGHT: 160 LBS | RESPIRATION RATE: 16 BRPM | HEART RATE: 60 BPM | HEIGHT: 64 IN | SYSTOLIC BLOOD PRESSURE: 122 MMHG

## 2025-07-09 DIAGNOSIS — I50.9 HEART FAILURE, UNSPECIFIED HF CHRONICITY, UNSPECIFIED HEART FAILURE TYPE (H): Primary | ICD-10-CM

## 2025-07-09 DIAGNOSIS — E78.5 HYPERLIPIDEMIA, UNSPECIFIED HYPERLIPIDEMIA TYPE: ICD-10-CM

## 2025-07-09 DIAGNOSIS — N18.30 STAGE 3 CHRONIC KIDNEY DISEASE, UNSPECIFIED WHETHER STAGE 3A OR 3B CKD (H): ICD-10-CM

## 2025-07-09 DIAGNOSIS — R60.0 PEDAL EDEMA: ICD-10-CM

## 2025-07-09 DIAGNOSIS — I51.7 LEFT VENTRICULAR HYPERTROPHY BY ELECTROCARDIOGRAPHY: ICD-10-CM

## 2025-07-09 DIAGNOSIS — I10 ESSENTIAL HYPERTENSION: ICD-10-CM

## 2025-07-09 PROCEDURE — 3074F SYST BP LT 130 MM HG: CPT | Performed by: GENERAL ACUTE CARE HOSPITAL

## 2025-07-09 PROCEDURE — G2211 COMPLEX E/M VISIT ADD ON: HCPCS | Performed by: GENERAL ACUTE CARE HOSPITAL

## 2025-07-09 PROCEDURE — 3078F DIAST BP <80 MM HG: CPT | Performed by: GENERAL ACUTE CARE HOSPITAL

## 2025-07-09 PROCEDURE — 99204 OFFICE O/P NEW MOD 45 MIN: CPT | Performed by: GENERAL ACUTE CARE HOSPITAL

## 2025-07-09 PROCEDURE — T1013 SIGN LANG/ORAL INTERPRETER: HCPCS | Mod: U4 | Performed by: INTERPRETER

## 2025-07-09 RX ORDER — CHLORTHALIDONE 25 MG/1
25 TABLET ORAL DAILY
Qty: 90 TABLET | Refills: 3 | Status: SHIPPED | OUTPATIENT
Start: 2025-07-09

## 2025-07-09 RX ORDER — ATORVASTATIN CALCIUM 40 MG/1
40 TABLET, FILM COATED ORAL EVERY EVENING
COMMUNITY
Start: 2023-10-24

## 2025-07-09 RX ORDER — CARVEDILOL 6.25 MG/1
6.25 TABLET ORAL 2 TIMES DAILY WITH MEALS
COMMUNITY
Start: 2023-10-24

## 2025-07-09 RX ORDER — ALLOPURINOL 100 MG/1
100 TABLET ORAL DAILY
COMMUNITY
Start: 2024-07-13

## 2025-07-09 RX ORDER — AMLODIPINE BESYLATE 5 MG/1
1 TABLET ORAL DAILY
COMMUNITY
Start: 2025-05-05 | End: 2025-07-09

## 2025-07-09 RX ORDER — LISINOPRIL 40 MG/1
40 TABLET ORAL DAILY
COMMUNITY
Start: 2023-10-23

## 2025-07-09 NOTE — LETTER
7/9/2025    Physician No Ref-Primary  No address on file    RE: Leonela Hayden       Dear Colleague,     I had the pleasure of seeing Leonela Hayden in the [facility].  HEART CARE ENCOUNTER NOTE      Thank you, Patti Reid PA-C, for asking the [facility] team to see Mr. Leonela Hayden to evaluate lower extremity edema.    Assessment/Recommendations   Assessment:    Bilateral pedal edema. This is potentially a side effect of amlodipine although with an elevated brain natriuretic peptide, possible left ventricular hypertrophy and mild exertional dyspnea he may certainly have some degree of heart failure.  Left ventricular hypertrophy suggested on resting 12-lead electrocardiogram 6/25/2025 with hypertensive heart disease being the most likely etiology.  Mild intracranial atherosclerosis noted on MR angiography of the head 3/6/2022.  Essential hypertension. Controlled.  Hyperlipidemia.  Chronic kidney disease stage III.  Overweight with body mass index 27.46 kg/m .    Plan:  Transthoracic echocardiogram.  Discontinue furosemide and amlodipine.  Start chlorthalidone 25 mg daily.  Lisinopril 40 mg daily.  Carvedilol 6.25 mg twice daily.  Follow-up with a general cardiology advanced practitioner in 2-3 months, with me as needed.         History of Present Illness   Mr. Leonela Hayden is a 55 year old male with a significant past history of HTN and CKD presenting for evaluation of pedal edema.    For the past month or so, he has noticed swelling in both feet with the right being more bothersome. It seems to worsen throughout the day but seems better when he first wakes up in the morning. He can get out of breath but typically only with heavy exertion and he does not notice shortness of breath with routine activity. He does note periodic chest pain that he cannot describe much further but says it does not seem to occur with exertion.    No shortness of breath at rest, light headedness/dizziness, pre-syncope,  syncope, lower extremity swelling, palpitations, paroxysmal nocturnal dyspnea (PND), or orthopnea.    He came to the ED 6/25/2025 due to his symptoms. Vital signs were normal. ECG showed sinus bradycardia with LVH. Labs were notable for creatinine 2.07 (near his baseline) and NT-proBNP mildly elevated at 715. Right lower extremity ultrasound showed no DVT but suggested elevated right heart pressures. He was prescribed furosemide but felt it did not help.    He thinks he has been on amlodipine for perhaps a year now but has been inconsistent in getting his BP medication filled.     Cardiac Problems and Cardiac Diagnostics     Most Recent Cardiac testing:  ECG dated 6/25/2025 (personaly reviewed and interpreted): sinus bradycardia HR 53 bpm, LAD, LVH with QRS widening    MRI/MRA head/neck 3/6/2022 (results reviewed):  HEAD MRI:   1.  No acute intracranial process.  2.  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  3.  Generalized brain atrophy and presumed microvascular ischemic changes as detailed above.     HEAD MRA:   1.  Intracranial atherosclerosis with mild stenosis of the distal left M1 middle cerebral artery branch and proximal left A1 anterior cerebral artery.  2.  Mild to moderate stenosis of the mid basilar artery.  3.  No proximal large vessel occlusion, aneurysm, or high flow vascular malformation.  4.  Variant [place] anatomy as above.     NECK MRA:  1.  No hemodynamically significant stenosis in the neck vessels by NASCET criteria.  2.  No evidence for dissection.    Right lower extremity ultrasound 6/25/2025 (results reviewed):  1.  No deep venous thrombosis in the right lower extremity.  2.  Increased phasicity and muted augmentation of flow throughout the right lower extremity, suggesting possible elevated right-sided heart pressures.     Medications  Allergies   Current Outpatient Medications  "  Medication Sig Dispense Refill     allopurinol (ZYLOPRIM) 100 MG tablet Take 100 mg by mouth daily.       amLODIPine (NORVASC) 5 MG tablet Take 1 tablet by mouth daily.       atorvastatin (LIPITOR) 40 MG tablet Take 40 mg by mouth every evening.       carvedilol (COREG) 6.25 MG tablet Take 6.25 mg by mouth 2 times daily (with meals).       furosemide (LASIX) 20 MG tablet Take 2 tablets (40 mg) by mouth daily. 60 tablet 0     lisinopril (ZESTRIL) 40 MG tablet Take 40 mg by mouth daily. for high blood pressure       cyclobenzaprine (FLEXERIL) 10 MG tablet [CYCLOBENZAPRINE (FLEXERIL) 10 MG TABLET] Take 1 tablet (10 mg total) by mouth 3 (three) times a day as needed for muscle spasms (back pain). (Patient not taking: Reported on 7/9/2025) 12 tablet 0     HYDROcodone-acetaminophen 5-325 mg per tablet [HYDROCODONE-ACETAMINOPHEN 5-325 MG PER TABLET] Take 1 tablet by mouth every 6 (six) hours as needed for pain. (Patient not taking: Reported on 7/9/2025) 8 tablet 0     lidocaine (LIDODERM) 5 % [LIDOCAINE (LIDODERM) 5 %] Remove & Discard patch within 12 hours or as directed by MD (Patient not taking: Reported on 7/9/2025) 15 patch 0      No Known Allergies     Physical Examination Review of Systems   /50 (BP Location: Right arm, Patient Position: Sitting, Cuff Size: Adult Regular)   Pulse 60   Resp 16   Ht 1.626 m (5' 4\")   Wt 72.6 kg (160 lb)   BMI 27.46 kg/m    Body mass index is 27.46 kg/m .  Wt Readings from Last 3 Encounters:   07/09/25 72.6 kg (160 lb)   06/25/25 71.5 kg (157 lb 9.6 oz)   03/06/22 74.8 kg (165 lb)       General Appearance:   Pleasant  male, appears  stated age. no acute distress, normal body habitus   ENT/Mouth: membranes moist, no apparent gingival bleeding.      EYES:  no scleral icterus, normal conjunctivae   Neck: no carotid bruits. No anterior cervical lymphadenopaty   Respiratory:   lungs are clear to auscultation, no rales or wheezing,  equal chest wall expansion    Cardiovascular:  "  Regular rhythm, normal rate. Normal first and second heart sounds with no murmurs, rubs, or gallops; the carotid, radial and posterior tibial pulses are intact, Jugular venous pressure not elevated, trace lower extremity edema bilaterally    Abdomen/GI:  no organomegaly, masses, bruits, or tenderness; bowel sounds are present   Extremities: no cyanosis or clubbing   Skin: no xanthelasma, warm.    Heme/lymph/ Immunology No apparent bleeding noted.   Neurologic: Alert and oriented. normal gait, no tremors     Psychiatric: Pleasant, calm, appropriate affect.    A complete 10 system review of systems was performed and is negative except as mentioned in the HPI/subjective.         Past History   Past Medical History:   Past Medical History:   Diagnosis Date     Chronic kidney disease      HLD (hyperlipidemia)      HTN (hypertension)        Past Surgical History:   Past Surgical History:   Procedure Laterality Date     NO HISTORY OF SURGERY         Family History:   Family History   Problem Relation Age of Onset     Heart Disease No family hx of         Social History:   Social History     Socioeconomic History     Marital status: Single     Spouse name: Not on file     Number of children: Not on file     Years of education: Not on file     Highest education level: Not on file   Occupational History     Not on file   Tobacco Use     Smoking status: Never     Smokeless tobacco: Never   Substance and Sexual Activity     Alcohol use: Not on file     Drug use: Not on file     Sexual activity: Not on file   Other Topics Concern     Not on file   Social History Narrative     Not on file     Social Drivers of Health     Financial Resource Strain: Not on file   Food Insecurity: Not on file   Transportation Needs: Not on file   Physical Activity: Not on file   Stress: Not on file   Social Connections: Not on file   Interpersonal Safety: Not on file   Housing Stability: Not on file              Lab Results    Chemistry/lipid CBC  Cardiac Enzymes/BNP/TSH/INR   Lab Results   Component Value Date    CR 2.07 (H) 06/25/2025    BUN 30.8 (H) 06/25/2025    POTASSIUM 4.7 06/25/2025     06/25/2025    CO2 22 06/25/2025      Lab Results   Component Value Date    WBC 8.5 06/25/2025    HGB 14.4 06/25/2025    HCT 45.4 06/25/2025    MCV 90 06/25/2025     06/25/2025    Lab Results   Component Value Date    TROPONINI 0.04 04/16/2021    NTBNP 715 (H) 06/25/2025          Tulio Barajas MD [facility]  Non-Invasive Cardiologist  [facility]  Pager [phone]      Thank you for allowing me to participate in the care of your patient.      Sincerely,     Tulio Barajas MD     [facility]  cc:   Patti Reid PA-C  [facility] [phone] [place] [address]

## 2025-07-09 NOTE — PROGRESS NOTES
HEART CARE ENCOUNTER NOTE      Thank you, Patti Reid PA-C, for asking the [facility] team to see Mr. Leonela Hayden to evaluate lower extremity edema.    Assessment/Recommendations   Assessment:    Bilateral pedal edema. This is potentially a side effect of amlodipine although with an elevated brain natriuretic peptide, possible left ventricular hypertrophy and mild exertional dyspnea he may certainly have some degree of heart failure.  Left ventricular hypertrophy suggested on resting 12-lead electrocardiogram 6/25/2025 with hypertensive heart disease being the most likely etiology.  Mild intracranial atherosclerosis noted on MR angiography of the head 3/6/2022.  Essential hypertension. Controlled.  Hyperlipidemia.  Chronic kidney disease stage III.  Overweight with body mass index 27.46 kg/m .    Plan:  Transthoracic echocardiogram.  Discontinue furosemide and amlodipine.  Start chlorthalidone 25 mg daily.  Lisinopril 40 mg daily.  Carvedilol 6.25 mg twice daily.  Follow-up with a general cardiology advanced practitioner in 2-3 months, with me as needed.         History of Present Illness   Mr. Leonela Hayden is a 55 year old male with a significant past history of HTN and CKD presenting for evaluation of pedal edema.    For the past month or so, he has noticed swelling in both feet with the right being more bothersome. It seems to worsen throughout the day but seems better when he first wakes up in the morning. He can get out of breath but typically only with heavy exertion and he does not notice shortness of breath with routine activity. He does note periodic chest pain that he cannot describe much further but says it does not seem to occur with exertion.    No shortness of breath at rest, light headedness/dizziness, pre-syncope, syncope, lower extremity swelling, palpitations, paroxysmal nocturnal dyspnea (PND), or orthopnea.    He came to the ED 6/25/2025 due to his symptoms. Vital signs were normal.  ECG showed sinus bradycardia with LVH. Labs were notable for creatinine 2.07 (near his baseline) and NT-proBNP mildly elevated at 715. Right lower extremity ultrasound showed no DVT but suggested elevated right heart pressures. He was prescribed furosemide but felt it did not help.    He thinks he has been on amlodipine for perhaps a year now but has been inconsistent in getting his BP medication filled.     Cardiac Problems and Cardiac Diagnostics     Most Recent Cardiac testing:  ECG dated 6/25/2025 (personaly reviewed and interpreted): sinus bradycardia HR 53 bpm, LAD, LVH with QRS widening    MRI/MRA head/neck 3/6/2022 (results reviewed):  HEAD MRI:   1.  No acute intracranial process.  2.  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  3.  Generalized brain atrophy and presumed microvascular ischemic changes as detailed above.     HEAD MRA:   1.  Intracranial atherosclerosis with mild stenosis of the distal left M1 middle cerebral artery branch and proximal left A1 anterior cerebral artery.  2.  Mild to moderate stenosis of the mid basilar artery.  3.  No proximal large vessel occlusion, aneurysm, or high flow vascular malformation.  4.  Variant [place] anatomy as above.     NECK MRA:  1.  No hemodynamically significant stenosis in the neck vessels by NASCET criteria.  2.  No evidence for dissection.    Right lower extremity ultrasound 6/25/2025 (results reviewed):  1.  No deep venous thrombosis in the right lower extremity.  2.  Increased phasicity and muted augmentation of flow throughout the right lower extremity, suggesting possible elevated right-sided heart pressures.     Medications  Allergies   Current Outpatient Medications   Medication Sig Dispense Refill    allopurinol (ZYLOPRIM) 100 MG tablet Take 100 mg by mouth daily.      amLODIPine (NORVASC) 5 MG tablet Take 1 tablet by mouth daily.       "atorvastatin (LIPITOR) 40 MG tablet Take 40 mg by mouth every evening.      carvedilol (COREG) 6.25 MG tablet Take 6.25 mg by mouth 2 times daily (with meals).      furosemide (LASIX) 20 MG tablet Take 2 tablets (40 mg) by mouth daily. 60 tablet 0    lisinopril (ZESTRIL) 40 MG tablet Take 40 mg by mouth daily. for high blood pressure      cyclobenzaprine (FLEXERIL) 10 MG tablet [CYCLOBENZAPRINE (FLEXERIL) 10 MG TABLET] Take 1 tablet (10 mg total) by mouth 3 (three) times a day as needed for muscle spasms (back pain). (Patient not taking: Reported on 7/9/2025) 12 tablet 0    HYDROcodone-acetaminophen 5-325 mg per tablet [HYDROCODONE-ACETAMINOPHEN 5-325 MG PER TABLET] Take 1 tablet by mouth every 6 (six) hours as needed for pain. (Patient not taking: Reported on 7/9/2025) 8 tablet 0    lidocaine (LIDODERM) 5 % [LIDOCAINE (LIDODERM) 5 %] Remove & Discard patch within 12 hours or as directed by MD (Patient not taking: Reported on 7/9/2025) 15 patch 0      No Known Allergies     Physical Examination Review of Systems   /50 (BP Location: Right arm, Patient Position: Sitting, Cuff Size: Adult Regular)   Pulse 60   Resp 16   Ht 1.626 m (5' 4\")   Wt 72.6 kg (160 lb)   BMI 27.46 kg/m    Body mass index is 27.46 kg/m .  Wt Readings from Last 3 Encounters:   07/09/25 72.6 kg (160 lb)   06/25/25 71.5 kg (157 lb 9.6 oz)   03/06/22 74.8 kg (165 lb)       General Appearance:   Pleasant  male, appears  stated age. no acute distress, normal body habitus   ENT/Mouth: membranes moist, no apparent gingival bleeding.      EYES:  no scleral icterus, normal conjunctivae   Neck: no carotid bruits. No anterior cervical lymphadenopaty   Respiratory:   lungs are clear to auscultation, no rales or wheezing,  equal chest wall expansion    Cardiovascular:   Regular rhythm, normal rate. Normal first and second heart sounds with no murmurs, rubs, or gallops; the carotid, radial and posterior tibial pulses are intact, Jugular venous " pressure not elevated, trace lower extremity edema bilaterally    Abdomen/GI:  no organomegaly, masses, bruits, or tenderness; bowel sounds are present   Extremities: no cyanosis or clubbing   Skin: no xanthelasma, warm.    Heme/lymph/ Immunology No apparent bleeding noted.   Neurologic: Alert and oriented. normal gait, no tremors     Psychiatric: Pleasant, calm, appropriate affect.    A complete 10 system review of systems was performed and is negative except as mentioned in the HPI/subjective.         Past History   Past Medical History:   Past Medical History:   Diagnosis Date    Chronic kidney disease     HLD (hyperlipidemia)     HTN (hypertension)        Past Surgical History:   Past Surgical History:   Procedure Laterality Date    NO HISTORY OF SURGERY         Family History:   Family History   Problem Relation Age of Onset    Heart Disease No family hx of         Social History:   Social History     Socioeconomic History    Marital status: Single     Spouse name: Not on file    Number of children: Not on file    Years of education: Not on file    Highest education level: Not on file   Occupational History    Not on file   Tobacco Use    Smoking status: Never    Smokeless tobacco: Never   Substance and Sexual Activity    Alcohol use: Not on file    Drug use: Not on file    Sexual activity: Not on file   Other Topics Concern    Not on file   Social History Narrative    Not on file     Social Drivers of Health     Financial Resource Strain: Not on file   Food Insecurity: Not on file   Transportation Needs: Not on file   Physical Activity: Not on file   Stress: Not on file   Social Connections: Not on file   Interpersonal Safety: Not on file   Housing Stability: Not on file              Lab Results    Chemistry/lipid CBC Cardiac Enzymes/BNP/TSH/INR   Lab Results   Component Value Date    CR 2.07 (H) 06/25/2025    BUN 30.8 (H) 06/25/2025    POTASSIUM 4.7 06/25/2025     06/25/2025    CO2 22 06/25/2025       Lab Results   Component Value Date    WBC 8.5 06/25/2025    HGB 14.4 06/25/2025    HCT 45.4 06/25/2025    MCV 90 06/25/2025     06/25/2025    Lab Results   Component Value Date    TROPONINI 0.04 04/16/2021    NTBNP 715 (H) 06/25/2025          Tulio Barajas MD [facility]  Non-Invasive Cardiologist  [facility]  Pager [phone]

## 2025-07-09 NOTE — PATIENT INSTRUCTIONS
"We will schedule you for an echocardiogram.  Stop amlodipine and furosemide.  We will start a new medication for blood pressure and edema called \"chlorthalidone\".  Follow-up with one of my assistants in 2 months.  "

## 2025-07-20 ENCOUNTER — HEALTH MAINTENANCE LETTER (OUTPATIENT)
Age: 55
End: 2025-07-20